# Patient Record
Sex: MALE | Race: WHITE | NOT HISPANIC OR LATINO | Employment: FULL TIME | URBAN - METROPOLITAN AREA
[De-identification: names, ages, dates, MRNs, and addresses within clinical notes are randomized per-mention and may not be internally consistent; named-entity substitution may affect disease eponyms.]

---

## 2017-11-10 ENCOUNTER — HOSPITAL ENCOUNTER (EMERGENCY)
Facility: HOSPITAL | Age: 32
Discharge: HOME | End: 2017-11-10
Attending: EMERGENCY MEDICINE | Admitting: EMERGENCY MEDICINE

## 2017-11-10 VITALS
BODY MASS INDEX: 21.2 KG/M2 | DIASTOLIC BLOOD PRESSURE: 65 MMHG | RESPIRATION RATE: 16 BRPM | SYSTOLIC BLOOD PRESSURE: 118 MMHG | TEMPERATURE: 98 F | HEART RATE: 85 BPM | OXYGEN SATURATION: 97 % | WEIGHT: 160 LBS | HEIGHT: 73 IN

## 2017-11-10 DIAGNOSIS — L03.313 CELLULITIS OF CHEST WALL: Primary | ICD-10-CM

## 2017-11-10 LAB
HOLD SPECIMEN: NORMAL
HOLD SPECIMEN: NORMAL
WHOLE BLOOD HOLD SPECIMEN: NORMAL
WHOLE BLOOD HOLD SPECIMEN: NORMAL

## 2017-11-10 PROCEDURE — 99283 EMERGENCY DEPT VISIT LOW MDM: CPT

## 2017-11-10 RX ORDER — SULFAMETHOXAZOLE AND TRIMETHOPRIM 800; 160 MG/1; MG/1
2 TABLET ORAL 2 TIMES DAILY
Qty: 28 TABLET | Refills: 0 | Status: SHIPPED | OUTPATIENT
Start: 2017-11-10 | End: 2017-11-17

## 2017-11-10 RX ORDER — CEPHALEXIN 500 MG/1
500 CAPSULE ORAL 4 TIMES DAILY
Qty: 28 CAPSULE | Refills: 0 | Status: SHIPPED | OUTPATIENT
Start: 2017-11-10 | End: 2017-11-17

## 2017-11-10 RX ORDER — SODIUM CHLORIDE 0.9 % (FLUSH) 0.9 %
10 SYRINGE (ML) INJECTION AS NEEDED
Status: DISCONTINUED | OUTPATIENT
Start: 2017-11-10 | End: 2017-11-11 | Stop reason: HOSPADM

## 2017-11-11 NOTE — ED PROVIDER NOTES
Subjective   HPI Comments: Mr. Gopal Lynn is a 32 year old male who presents to the ED with c/o left sided CP. He states that he began feeling the pain at 1500 today and first looked at his chest at 1545 at which point he noticed some swelling and redness around his left nipple. He is not currently on any antibiotics and denies any allergies to antibiotics. He denies cough, SoA, abdominal pain, nausea, vomiting, fever, and any other complaint at this time.    Patient is a 32 y.o. male presenting with chest pain.   History provided by:  Patient  Chest Pain   Pain location:  L chest  Pain radiates to:  Does not radiate  Pain severity:  Unable to specify  Onset quality:  Sudden  Duration: Onset 1300.  Timing:  Constant  Progression:  Worsening  Chronicity:  New  Relieved by:  None tried  Worsened by:  Nothing  Ineffective treatments:  None tried  Associated symptoms: no abdominal pain, no cough, no fever, no nausea, no shortness of breath and no vomiting    Risk factors: male sex        Review of Systems   Constitutional: Negative for fever.   Respiratory: Negative for cough and shortness of breath.    Cardiovascular: Positive for chest pain.   Gastrointestinal: Negative for abdominal pain, nausea and vomiting.   All other systems reviewed and are negative.      History reviewed. No pertinent past medical history.    No Known Allergies    History reviewed. No pertinent surgical history.    History reviewed. No pertinent family history.    Social History     Social History   • Marital status: Single     Spouse name: N/A   • Number of children: N/A   • Years of education: N/A     Social History Main Topics   • Smoking status: Never Smoker   • Smokeless tobacco: None   • Alcohol use No   • Drug use: No   • Sexual activity: Defer     Other Topics Concern   • None     Social History Narrative   • None         Objective   Physical Exam   Constitutional: He is oriented to person, place, and time. He appears  well-developed and well-nourished. No distress.   HENT:   Head: Normocephalic and atraumatic.   Nose: Nose normal.   Eyes: Conjunctivae are normal. No scleral icterus.   Neck: Normal range of motion. Neck supple.   Cardiovascular: Normal rate, regular rhythm and normal heart sounds.    No murmur heard.  Pulmonary/Chest: Effort normal and breath sounds normal. No respiratory distress.   Abdominal: Soft. He exhibits no distension.   Musculoskeletal: Normal range of motion. He exhibits no edema.   Neurological: He is alert and oriented to person, place, and time.   Skin: Skin is warm and dry. There is erythema.   Inferior to left nipple there is an area of erythema approximately 2.5 cm in the vertical dimension and 3.5 cm in the horizontal dimension. There is also an area of palpable firmness with no fluctuance, break in the skin, or drainage which is less than 0.5 cm in dimension. There is also streaking to the left anterior chest, which is mild in nature.     Psychiatric: He has a normal mood and affect. His behavior is normal.   Nursing note and vitals reviewed.      Procedures         ED Course  ED Course     Recent Results (from the past 24 hour(s))   Light Blue Top    Collection Time: 11/10/17  9:55 PM   Result Value Ref Range    Extra Tube hold for add-on    Green Top (Gel)    Collection Time: 11/10/17  9:55 PM   Result Value Ref Range    Extra Tube Hold for add-ons.    Lavender Top    Collection Time: 11/10/17  9:55 PM   Result Value Ref Range    Extra Tube hold for add-on    Gold Top - SST    Collection Time: 11/10/17  9:55 PM   Result Value Ref Range    Extra Tube Hold for add-ons.      Note: In addition to lab results from this visit, the labs listed above may include labs taken at another facility or during a different encounter within the last 24 hours. Please correlate lab times with ED admission and discharge times for further clarification of the services performed during this visit.    No orders to  "display     Vitals:    11/10/17 2132   BP: 118/65   BP Location: Left arm   Patient Position: Sitting   Pulse: 85   Resp: 16   Temp: 98 °F (36.7 °C)   TempSrc: Oral   SpO2: 97%   Weight: 160 lb (72.6 kg)   Height: 73\" (185.4 cm)     Medications   sodium chloride 0.9 % flush 10 mL (not administered)     ECG/EMG Results (last 24 hours)     ** No results found for the last 24 hours. **                          MDM  Number of Diagnoses or Management Options  Cellulitis of chest wall: new and requires workup  Diagnosis management comments: Patient will be given combination of bactrim and keflex for treatment of cellulitis. No incision and drainage will be performed at this time. No fluctuance present.     Area of induration less than 0.5 cm in diameter.     Advise patient take ibuprofen or tylenol for pain.     Followup with PCP for repeat evaluation in 1 week.        Amount and/or Complexity of Data Reviewed  Review and summarize past medical records: yes  Independent visualization of images, tracings, or specimens: yes    Patient Progress  Patient progress: stable      Final diagnoses:   Cellulitis of chest wall       Documentation assistance provided by guillermina Brooks.  Information recorded by the guillermina was done at my direction and has been verified and validated by me.     Emily Brooks  11/10/17 2211       Emily Brooks  11/10/17 2302       Carter Negron MD  11/11/17 0751    "

## 2017-11-11 NOTE — DISCHARGE INSTRUCTIONS
Advised to followup with PCP for repeat evaluation in approximately 3 days.     Return to ER with worsening of symptoms or further concerns.     Take bactrim and keflex as prescribed.     Take ibuprofen or tylenol as needed for fever/body aches/chills/pain.